# Patient Record
Sex: MALE | Race: WHITE | NOT HISPANIC OR LATINO | Employment: FULL TIME | URBAN - METROPOLITAN AREA
[De-identification: names, ages, dates, MRNs, and addresses within clinical notes are randomized per-mention and may not be internally consistent; named-entity substitution may affect disease eponyms.]

---

## 2023-05-13 ENCOUNTER — HOSPITAL ENCOUNTER (EMERGENCY)
Facility: HOSPITAL | Age: 53
Discharge: HOME | End: 2023-05-13
Attending: EMERGENCY MEDICINE
Payer: COMMERCIAL

## 2023-05-13 VITALS
OXYGEN SATURATION: 96 % | SYSTOLIC BLOOD PRESSURE: 171 MMHG | RESPIRATION RATE: 18 BRPM | DIASTOLIC BLOOD PRESSURE: 84 MMHG | TEMPERATURE: 98 F | HEART RATE: 65 BPM | WEIGHT: 205 LBS

## 2023-05-13 DIAGNOSIS — K02.9 PAIN DUE TO DENTAL CARIES: ICD-10-CM

## 2023-05-13 DIAGNOSIS — K08.89 DENTALGIA: Primary | ICD-10-CM

## 2023-05-13 PROCEDURE — 25000003 PHARM REV CODE 250: Mod: ER | Performed by: EMERGENCY MEDICINE

## 2023-05-13 PROCEDURE — 99284 EMERGENCY DEPT VISIT MOD MDM: CPT | Mod: ER

## 2023-05-13 RX ORDER — IBUPROFEN 600 MG/1
600 TABLET ORAL EVERY 6 HOURS PRN
Qty: 20 TABLET | Refills: 0 | Status: SHIPPED | OUTPATIENT
Start: 2023-05-13

## 2023-05-13 RX ORDER — ACETAMINOPHEN 500 MG
1000 TABLET ORAL EVERY 6 HOURS PRN
Qty: 30 TABLET | Refills: 0 | Status: SHIPPED | OUTPATIENT
Start: 2023-05-13

## 2023-05-13 RX ORDER — ACETAMINOPHEN 500 MG
1000 TABLET ORAL
Status: COMPLETED | OUTPATIENT
Start: 2023-05-13 | End: 2023-05-13

## 2023-05-13 RX ORDER — PENICILLIN V POTASSIUM 500 MG/1
500 TABLET, FILM COATED ORAL 4 TIMES DAILY
Qty: 28 TABLET | Refills: 0 | Status: SHIPPED | OUTPATIENT
Start: 2023-05-13 | End: 2023-05-20

## 2023-05-13 RX ORDER — LIDOCAINE HYDROCHLORIDE 20 MG/ML
SOLUTION OROPHARYNGEAL
Qty: 30 ML | Refills: 0 | Status: SHIPPED | OUTPATIENT
Start: 2023-05-13

## 2023-05-13 RX ADMIN — ACETAMINOPHEN 1000 MG: 500 TABLET, FILM COATED ORAL at 09:05

## 2023-05-13 NOTE — ED PROVIDER NOTES
Encounter Date: 5/13/2023    SCRIBE #1 NOTE: I, Carol David, am scribing for, and in the presence of,  Julieta Bush DO. I have scribed the following portions of the note - Other sections scribed: HPI, ROS, PE.     History     Chief Complaint   Patient presents with    Dental Pain     Cem Green, a 53 y.o. male presents to the ED via PV with CC of L lower dental pain, onset yesterday. Pt reports taking OTC Ibuprofen at 0600am with no relief.         Cem Green 53 y.o. male, with PMHx of HLD, presents to the ED with dental pain onset yesterday. Treatment has been attempted with Ibuprofen 3 hours ago without improvement. Patient denies fever, chills, ear pain, or other associated symptoms. No known alleviating or aggravating factors. Patient has NKDA.    The history is provided by the patient. No  was used.   Review of patient's allergies indicates:  No Known Allergies  No past medical history on file.  No past surgical history on file.  No family history on file.     Review of Systems   Constitutional:  Negative for chills and fever.   HENT:  Positive for dental problem. Negative for ear pain, rhinorrhea and sore throat.    Respiratory:  Negative for shortness of breath.    Cardiovascular:  Negative for leg swelling.   Skin:  Negative for rash.   Neurological:  Negative for numbness.   All other systems reviewed and are negative.    Physical Exam     Initial Vitals [05/13/23 0852]   BP Pulse Resp Temp SpO2   (!) 171/84 65 18 97.7 °F (36.5 °C) 96 %      MAP       --         Patient gave consent to have physical exam performed.   Physical Exam    Nursing note and vitals reviewed.  Constitutional: He appears well-developed and well-nourished.   HENT:   Head: Normocephalic and atraumatic.   Right Ear: External ear normal.   Left Ear: External ear normal.   Nose: Nose normal.   Mouth/Throat: Oropharynx is clear and moist.   Left posterior molar tenderness and swelling noted at the  left lower gums. No fluctuance noted.   Eyes: Conjunctivae and EOM are normal. Pupils are equal, round, and reactive to light.   Neck: Neck supple.   Normal range of motion.  Cardiovascular:  Normal rate, regular rhythm and normal heart sounds.     Exam reveals no gallop and no friction rub.       No murmur heard.  Pulmonary/Chest: Breath sounds normal. No respiratory distress. He has no wheezes. He has no rhonchi. He has no rales.   Abdominal: Abdomen is soft. Bowel sounds are normal. There is no abdominal tenderness. There is no rebound and no guarding.   Musculoskeletal:         General: No tenderness or edema. Normal range of motion.      Cervical back: Normal range of motion and neck supple.     Neurological: He is alert and oriented to person, place, and time. No cranial nerve deficit.   Skin: Skin is warm and dry. Capillary refill takes less than 2 seconds. No rash noted.   Psychiatric: He has a normal mood and affect. His behavior is normal.       ED Course   Procedures  Labs Reviewed - No data to display       Imaging Results    None          Medications   acetaminophen tablet 1,000 mg (1,000 mg Oral Given 5/13/23 0910)     Medical Decision Making:   History:   Old Medical Records: I decided to obtain old medical records.        This is an evaluation of a 53 y.o. male that presents to the Emergency Department for   Chief Complaint   Patient presents with    Dental Pain     Cem Green, a 53 y.o. male presents to the ED via PV with CC of L lower dental pain, onset yesterday. Pt reports taking OTC Ibuprofen at 0600am with no relief.           The patient is a non-toxic and well appearing patient. On physical exam, patient appears well hydrated with moist mucus membranes. Breath sounds are clear and equal bilaterally with no adventitious breath sounds, tachypnea or respiratory distress. Regular rate and rhythm. No murmurs. Abdomen soft and non tender. Patient is tolerating PO without  difficulty.  Vital Signs Are Reassuring.     Differential diagnosis: Dentalgia, Dental decay, Dental pain.    ED Course:Treatment in the ED included Physical Exam and medications given in ED  Medications   acetaminophen tablet 1,000 mg (1,000 mg Oral Given 5/13/23 0910)   .   Patient declined Toradol injection or injection for pain in the ED.     In shared decision making with the patient, we discussed treatment and prescriptions.    Discharge home with   ED Prescriptions       Medication Sig Dispense Start Date End Date Auth. Provider    LIDOcaine HCl 2% (XYLOCAINE) 2 % Soln by Mucous Membrane route every 3 (three) hours as needed. Apply with Q-tip to painful area every 3 hr as needed for pain 30 mL 5/13/2023 -- Julieta Bush, DO    ibuprofen (ADVIL,MOTRIN) 600 MG tablet Take 1 tablet (600 mg total) by mouth every 6 (six) hours as needed for Pain (Take with food as needed for mild-to-moderate pain). 20 tablet 5/13/2023 -- Julieta Bush DO    acetaminophen (TYLENOL) 500 MG tablet Take 2 tablets (1,000 mg total) by mouth every 6 (six) hours as needed for Pain. 30 tablet 5/13/2023 -- Julieta Bush DO    penicillin v potassium (VEETID) 500 MG tablet Take 1 tablet (500 mg total) by mouth 4 (four) times daily. for 7 days 28 tablet 5/13/2023 5/20/2023 Julieta Bush DO          Fill and take prescriptions as directed.  Return to the ED if symptoms worsen or do not resolve.   Answered questions and discussed discharge plan.    Patient feels better and is ready for discharge.  Follow up with PCP/specialist in 1 day     Scribe Attestation:   Scribe #1: I performed the above scribed service and the documentation accurately describes the services I performed. I attest to the accuracy of the note.                  I, Dr. Julieta Bush, personally performed the services described in this documentation. This document was produced by a scribe under my direction and in my presence. All medical record entries made by the scribe were  at my direction and in my presence.  I have reviewed the chart and agree that the record reflects my personal performance and is accurate and complete. Julieta Bush DO.     05/13/2023 9:50 AM    Clinical Impression:   Final diagnoses:  [K08.89] Dentalgia (Primary)  [K02.9] Pain due to dental caries        ED Disposition Condition    Discharge Stable          ED Prescriptions       Medication Sig Dispense Start Date End Date Auth. Provider    LIDOcaine HCl 2% (XYLOCAINE) 2 % Soln by Mucous Membrane route every 3 (three) hours as needed. Apply with Q-tip to painful area every 3 hr as needed for pain 30 mL 5/13/2023 -- Julieta Bush DO    ibuprofen (ADVIL,MOTRIN) 600 MG tablet Take 1 tablet (600 mg total) by mouth every 6 (six) hours as needed for Pain (Take with food as needed for mild-to-moderate pain). 20 tablet 5/13/2023 -- Julieta Bush DO    acetaminophen (TYLENOL) 500 MG tablet Take 2 tablets (1,000 mg total) by mouth every 6 (six) hours as needed for Pain. 30 tablet 5/13/2023 -- Julieta Bush DO    penicillin v potassium (VEETID) 500 MG tablet Take 1 tablet (500 mg total) by mouth 4 (four) times daily. for 7 days 28 tablet 5/13/2023 5/20/2023 Julieta Bush DO          Follow-up Information       Follow up With Specialties Details Why Contact [facility], Oral and Maxillofacial Surgery, Oral Surgery Go to  [id number] [address]  [phone]               Julieta Bush DO  05/13/23 0958

## 2023-05-13 NOTE — Clinical Note
"Cem Adrian "Cem Green was seen and treated in our emergency department on 5/13/2023.  He may return to work on 05/15/2023.       If you have any questions or concerns, please don't hesitate to call.      Julieta Bush, DO"